# Patient Record
Sex: FEMALE | Race: WHITE | NOT HISPANIC OR LATINO | Employment: FULL TIME | URBAN - METROPOLITAN AREA
[De-identification: names, ages, dates, MRNs, and addresses within clinical notes are randomized per-mention and may not be internally consistent; named-entity substitution may affect disease eponyms.]

---

## 2017-11-15 ENCOUNTER — APPOINTMENT (OUTPATIENT)
Dept: CT IMAGING | Facility: CLINIC | Age: 22
End: 2017-11-15
Attending: EMERGENCY MEDICINE
Payer: COMMERCIAL

## 2017-11-15 ENCOUNTER — TRANSFERRED RECORDS (OUTPATIENT)
Dept: HEALTH INFORMATION MANAGEMENT | Facility: CLINIC | Age: 22
End: 2017-11-15

## 2017-11-15 ENCOUNTER — HOSPITAL ENCOUNTER (OUTPATIENT)
Facility: CLINIC | Age: 22
Setting detail: OBSERVATION
Discharge: HOME | End: 2017-11-16
Attending: EMERGENCY MEDICINE | Admitting: EMERGENCY MEDICINE
Payer: COMMERCIAL

## 2017-11-15 DIAGNOSIS — N39.0 URINARY TRACT INFECTION WITHOUT HEMATURIA, SITE UNSPECIFIED: Primary | ICD-10-CM

## 2017-11-15 DIAGNOSIS — N30.00 ACUTE CYSTITIS WITHOUT HEMATURIA: ICD-10-CM

## 2017-11-15 DIAGNOSIS — R10.13 ABDOMINAL PAIN, EPIGASTRIC: ICD-10-CM

## 2017-11-15 DIAGNOSIS — R10.11 ABDOMINAL PAIN, RIGHT UPPER QUADRANT: ICD-10-CM

## 2017-11-15 LAB
ALBUMIN SERPL-MCNC: 3.3 G/DL (ref 3.4–5)
ALBUMIN UR-MCNC: 10 MG/DL
ALP SERPL-CCNC: 43 U/L (ref 40–150)
ALT SERPL W P-5'-P-CCNC: 38 U/L (ref 0–50)
ANION GAP SERPL CALCULATED.3IONS-SCNC: 7 MMOL/L (ref 3–14)
APPEARANCE UR: CLEAR
AST SERPL W P-5'-P-CCNC: 37 U/L (ref 0–45)
BACTERIA #/AREA URNS HPF: ABNORMAL /HPF
BASOPHILS # BLD AUTO: 0 10E9/L (ref 0–0.2)
BASOPHILS NFR BLD AUTO: 0.2 %
BILIRUB DIRECT SERPL-MCNC: 0.1 MG/DL (ref 0–0.2)
BILIRUB SERPL-MCNC: 0.7 MG/DL (ref 0.2–1.3)
BILIRUB UR QL STRIP: NEGATIVE
BUN SERPL-MCNC: 8 MG/DL (ref 7–30)
CALCIUM SERPL-MCNC: 9 MG/DL (ref 8.5–10.1)
CHLORIDE SERPL-SCNC: 106 MMOL/L (ref 94–109)
CO2 SERPL-SCNC: 24 MMOL/L (ref 20–32)
COLOR UR AUTO: YELLOW
CREAT BLD-MCNC: 0.9 MG/DL (ref 0.52–1.04)
CREAT SERPL-MCNC: 0.86 MG/DL (ref 0.52–1.04)
DIFFERENTIAL METHOD BLD: ABNORMAL
EOSINOPHIL # BLD AUTO: 0 10E9/L (ref 0–0.7)
EOSINOPHIL NFR BLD AUTO: 0.1 %
ERYTHROCYTE [DISTWIDTH] IN BLOOD BY AUTOMATED COUNT: 13.4 % (ref 10–15)
GFR SERPL CREATININE-BSD FRML MDRD: 79 ML/MIN/1.7M2
GFR SERPL CREATININE-BSD FRML MDRD: 83 ML/MIN/1.7M2
GLUCOSE SERPL-MCNC: 88 MG/DL (ref 70–99)
GLUCOSE UR STRIP-MCNC: NEGATIVE MG/DL
HCG SERPL QL: NEGATIVE
HCG UR QL: NEGATIVE
HCT VFR BLD AUTO: 41.8 % (ref 35–47)
HGB BLD-MCNC: 13.7 G/DL (ref 11.7–15.7)
HGB UR QL STRIP: ABNORMAL
IMM GRANULOCYTES # BLD: 0 10E9/L (ref 0–0.4)
IMM GRANULOCYTES NFR BLD: 0.2 %
KETONES UR STRIP-MCNC: 80 MG/DL
LEUKOCYTE ESTERASE UR QL STRIP: ABNORMAL
LIPASE SERPL-CCNC: 118 U/L (ref 73–393)
LYMPHOCYTES # BLD AUTO: 1.5 10E9/L (ref 0.8–5.3)
LYMPHOCYTES NFR BLD AUTO: 13.4 %
MCH RBC QN AUTO: 30.9 PG (ref 26.5–33)
MCHC RBC AUTO-ENTMCNC: 32.8 G/DL (ref 31.5–36.5)
MCV RBC AUTO: 94 FL (ref 78–100)
MONOCYTES # BLD AUTO: 1.4 10E9/L (ref 0–1.3)
MONOCYTES NFR BLD AUTO: 12.5 %
MUCOUS THREADS #/AREA URNS LPF: PRESENT /LPF
NEUTROPHILS # BLD AUTO: 8.4 10E9/L (ref 1.6–8.3)
NEUTROPHILS NFR BLD AUTO: 73.6 %
NITRATE UR QL: NEGATIVE
NRBC # BLD AUTO: 0 10*3/UL
NRBC BLD AUTO-RTO: 0 /100
PH UR STRIP: 6 PH (ref 5–7)
PLATELET # BLD AUTO: 271 10E9/L (ref 150–450)
POTASSIUM SERPL-SCNC: 3.5 MMOL/L (ref 3.4–5.3)
PROT SERPL-MCNC: 7.6 G/DL (ref 6.8–8.8)
RBC # BLD AUTO: 4.44 10E12/L (ref 3.8–5.2)
RBC #/AREA URNS AUTO: 8 /HPF (ref 0–2)
SODIUM SERPL-SCNC: 138 MMOL/L (ref 133–144)
SOURCE: ABNORMAL
SP GR UR STRIP: 1.01 (ref 1–1.03)
SQUAMOUS #/AREA URNS AUTO: 2 /HPF (ref 0–1)
UROBILINOGEN UR STRIP-MCNC: 2 MG/DL (ref 0–2)
WBC # BLD AUTO: 11.4 10E9/L (ref 4–11)
WBC #/AREA URNS AUTO: 2 /HPF (ref 0–2)

## 2017-11-15 PROCEDURE — 96375 TX/PRO/DX INJ NEW DRUG ADDON: CPT

## 2017-11-15 PROCEDURE — 85025 COMPLETE CBC W/AUTO DIFF WBC: CPT | Performed by: EMERGENCY MEDICINE

## 2017-11-15 PROCEDURE — 25000128 H RX IP 250 OP 636: Performed by: EMERGENCY MEDICINE

## 2017-11-15 PROCEDURE — 80048 BASIC METABOLIC PNL TOTAL CA: CPT | Performed by: EMERGENCY MEDICINE

## 2017-11-15 PROCEDURE — 83690 ASSAY OF LIPASE: CPT | Performed by: EMERGENCY MEDICINE

## 2017-11-15 PROCEDURE — 81001 URINALYSIS AUTO W/SCOPE: CPT | Performed by: EMERGENCY MEDICINE

## 2017-11-15 PROCEDURE — 87086 URINE CULTURE/COLONY COUNT: CPT | Performed by: PHYSICIAN ASSISTANT

## 2017-11-15 PROCEDURE — 80076 HEPATIC FUNCTION PANEL: CPT | Performed by: EMERGENCY MEDICINE

## 2017-11-15 PROCEDURE — 96365 THER/PROPH/DIAG IV INF INIT: CPT

## 2017-11-15 PROCEDURE — 71260 CT THORAX DX C+: CPT

## 2017-11-15 PROCEDURE — 99285 EMERGENCY DEPT VISIT HI MDM: CPT | Mod: 25

## 2017-11-15 PROCEDURE — 99219 ZZC INITIAL OBSERVATION CARE,LEVL II: CPT | Mod: Z6 | Performed by: PHYSICIAN ASSISTANT

## 2017-11-15 PROCEDURE — 84703 CHORIONIC GONADOTROPIN ASSAY: CPT | Performed by: EMERGENCY MEDICINE

## 2017-11-15 PROCEDURE — 82565 ASSAY OF CREATININE: CPT

## 2017-11-15 PROCEDURE — 93005 ELECTROCARDIOGRAM TRACING: CPT

## 2017-11-15 PROCEDURE — 81025 URINE PREGNANCY TEST: CPT | Performed by: EMERGENCY MEDICINE

## 2017-11-15 PROCEDURE — 96361 HYDRATE IV INFUSION ADD-ON: CPT

## 2017-11-15 RX ORDER — KETOROLAC TROMETHAMINE 30 MG/ML
15 INJECTION, SOLUTION INTRAMUSCULAR; INTRAVENOUS ONCE
Status: COMPLETED | OUTPATIENT
Start: 2017-11-15 | End: 2017-11-16

## 2017-11-15 RX ORDER — SODIUM CHLORIDE 9 MG/ML
1000 INJECTION, SOLUTION INTRAVENOUS CONTINUOUS
Status: DISCONTINUED | OUTPATIENT
Start: 2017-11-15 | End: 2017-11-17 | Stop reason: HOSPADM

## 2017-11-15 RX ORDER — CEFTRIAXONE 1 G/1
1 INJECTION, POWDER, FOR SOLUTION INTRAMUSCULAR; INTRAVENOUS ONCE
Status: COMPLETED | OUTPATIENT
Start: 2017-11-15 | End: 2017-11-15

## 2017-11-15 RX ORDER — MORPHINE SULFATE 2 MG/ML
1-2 INJECTION, SOLUTION INTRAMUSCULAR; INTRAVENOUS EVERY 4 HOURS PRN
Status: DISCONTINUED | OUTPATIENT
Start: 2017-11-15 | End: 2017-11-17 | Stop reason: HOSPADM

## 2017-11-15 RX ORDER — IOPAMIDOL 755 MG/ML
75 INJECTION, SOLUTION INTRAVASCULAR ONCE
Status: COMPLETED | OUTPATIENT
Start: 2017-11-15 | End: 2017-11-15

## 2017-11-15 RX ADMIN — SODIUM CHLORIDE 1000 ML: 9 INJECTION, SOLUTION INTRAVENOUS at 20:33

## 2017-11-15 RX ADMIN — SODIUM CHLORIDE 1000 ML: 9 INJECTION, SOLUTION INTRAVENOUS at 23:04

## 2017-11-15 RX ADMIN — CEFTRIAXONE 1 G: 1 INJECTION, POWDER, FOR SOLUTION INTRAMUSCULAR; INTRAVENOUS at 20:59

## 2017-11-15 RX ADMIN — IOPAMIDOL 75 ML: 755 INJECTION, SOLUTION INTRAVENOUS at 19:48

## 2017-11-15 ASSESSMENT — ENCOUNTER SYMPTOMS
BACK PAIN: 0
ABDOMINAL PAIN: 1
HEMATURIA: 0
SHORTNESS OF BREATH: 0
DYSURIA: 0
VOMITING: 0
CHILLS: 0
NAUSEA: 1
FEVER: 0
COUGH: 1

## 2017-11-15 NOTE — ED PROVIDER NOTES
History     Chief Complaint   Patient presents with     Abdominal Pain     HPI  Rianna West is a 22 year old female who presents for evaluation of abdominal pain. The patient reports that she developed pain in the right upper quadrant abdomen on Monday, 2 days ago, which has been constant since onset. She has noted that the pain is provoked with deep breaths and with coughs. She reports some associated intermittent, mild nausea, but she denies any vomiting. She also denies any chest pain or leg pain, and she states that she has no history of DVT/PE, has done no recent long-distance travel, and has not undergone any recent surgical procedure. She does report b/l low back pain, perhaps R>L. She denies urinary symptoms, no lightheadedness or dizziness, and she states that she has no history of heart problem. She reports that she is otherwise healthy. She has been on oral contraceptive therapy over the past couple of years, states her last menstrual period was in July, which is normal for her. S    She was seen at [facility] today where she was diagnosed with a UTI and had a negative RUQ U/S. She was scheduled for an outpatient HIDA scan next week. She also had a +dimer and was found to be tachycardic, for which reason she was referred into ED.    No current facility-administered medications for this encounter.      No current outpatient prescriptions on file.     No past medical history on file.    No past surgical history on file.    No family history on file.    Social History   Substance Use Topics     Smoking status: Not on file     Smokeless tobacco: Not on file     Alcohol use Not on file      No Known Allergies    I have reviewed the Medications, Allergies, Past Medical and Surgical History, and Social History in the Epic system.    Review of Systems   Constitutional: Negative for chills and fever.   Respiratory: Positive for cough. Negative for shortness of breath.    Cardiovascular: Negative  "for chest pain and leg swelling.   Gastrointestinal: Positive for abdominal pain (RUQ) and nausea (intermittent). Negative for vomiting.   Genitourinary: Negative for dysuria, hematuria, vaginal bleeding and vaginal discharge.   Musculoskeletal: Negative for back pain.   All other systems reviewed and are negative.      Physical Exam   BP: (!) 158/91  Pulse: 142  Temp: 97.8  F (36.6  C)  Resp: 16  Height: 167.6 cm (5' 6\")  Weight: 122.5 kg (270 lb)  SpO2: 97 %      Physical Exam   Constitutional: She is oriented to person, place, and time. No distress.   HENT:   Head: Atraumatic.   Mouth/Throat: Oropharynx is clear and moist. No oropharyngeal exudate.   Eyes: Pupils are equal, round, and reactive to light. No scleral icterus.   Cardiovascular: Normal heart sounds and intact distal pulses.    Pulmonary/Chest: Breath sounds normal. No respiratory distress.   Abdominal: Soft. Bowel sounds are normal. There is tenderness.   Minimally tender in RUQ to deep palpation, neg Tena's   Genitourinary:   Genitourinary Comments: No CVA tenderness   Musculoskeletal: She exhibits no edema or tenderness.   Neurological: She is alert and oriented to person, place, and time.   Skin: Skin is warm. No rash noted. She is not diaphoretic.   Nursing note and vitals reviewed.      ED Course     ED Course     Procedures         EKG: sinus tach, non-ischemic    Critical Care time:  none             Labs Ordered and Resulted from Time of ED Arrival Up to the Time of Departure from the ED - No data to display         Assessments & Plan (with Medical Decision Making)     22 year old female who presents for evaluation of abdominal pain and back pain and +dimer from [place]. Her lab result and tachycardia are concerning for possible PE. IV established and patient placed on cardiac monitor which revealed sinus tach in 130s but otherwise stable VS. Labs sent and remarkable for WBC 11.4. Patient sent for CT Chest PE protocol which was neg for PE. " Patient given NSS 1 L IVF bolus with persistent tachycardia. Patient given po challenge with recurrence of RUQ pain. Repeat abdominal exam unchanged, minimally tender. U/A sent and essentially negative, however given leukocytosis and report of +UA this am, patient given ceftriaxone 1g IV. Case discussed with ED OBS ANDREW and plans for ED OBS stay with serial abdominal exams and MRCP in am.     I have reviewed the nursing notes.    I have reviewed the findings, diagnosis, plan and need for follow up with the patient.    New Prescriptions    No medications on file       Final diagnoses:   Abdominal pain, epigastric     I, Stephan Ashraf, sandrine serving as a trained medical scribe to document services personally performed by Vivek Mcdaniel MD, based on the provider's statements to me.      IVivek MD, was physically present and have reviewed and verified the accuracy of this note documented by Stephan Ashraf.    11/15/2017   [facility], EMERGENCY DEPARTMENT     Vivek Mcdaniel MD  11/16/17 0115

## 2017-11-15 NOTE — IP AVS SNAPSHOT
Unit 6D Observation [address] [address]    Phone:  [phone]    Fax:  [phone]                                       After Visit Summary   11/15/2017    Rianna West    MRN: [id number]           After Visit Summary Signature Page     I have received my discharge instructions, and my questions have been answered. I have discussed any challenges I see with this plan with the nurse or doctor.    ..........................................................................................................................................  Patient/Patient Representative Signature      ..........................................................................................................................................  Patient Representative Print Name and Relationship to Patient    ..................................................               ................................................  Date                                            Time    ..........................................................................................................................................  Reviewed by Signature/Title    ...................................................              ..............................................  Date                                                            Time

## 2017-11-15 NOTE — ED NOTES
"Triage Assessment & Note:    BP (!) 158/91  Pulse 142  Temp 97.8  F (36.6  C) (Oral)  Resp 16  Ht 1.676 m (5' 6\")  Wt 122.5 kg (270 lb)  LMP 07/12/2017 (Approximate)  SpO2 97%  BMI 43.58 kg/m2    Patient presents with: C/o RUQ abdominal pain with a cough. PT is noted to have an elevated HR and diaphoresis with chills. PT was seen at [facility] 2 times this week.     Home Treatments/Remedies: None    Febrile / Afebrile? Afebrile    Duration of C/o:  3 days    Margarito Ames  November 15, 2017      "

## 2017-11-15 NOTE — IP AVS SNAPSHOT
MRN:[id number]                      After Visit Summary   11/15/2017    Rianna West    MRN: [id number]           Thank you!     Thank you for choosing [facility] for your care. Our goal is always to provide you with excellent care. Hearing back from our patients is one way we can continue to improve our services. Please take a few minutes to complete the written survey that you may receive in the mail after you visit with us. Thank you!        Patient Information     Date Of Birth          1995        Designated Caregiver       Most Recent Value    Caregiver    Will someone help with your care after discharge? no      About your hospital stay     You were admitted on:  November 16, 2017 You last received care in the:  Unit 6D Observation [facility]    You were discharged on:  November 16, 2017        Reason for your hospital stay       You were admitted to ED observation unit due to ongoing abdominal pain associated with fast heart rate and elevated blood pressure, for serial abdominal exams, lab work up and studies. Your vitals signs improved with IV fluids and your lab work up and imaging studies were negative for any acute cause of your pain. You were also treated for a UTI.                  Who to Call     For medical emergencies, please call 911.  For non-urgent questions about your medical care, please call your primary care provider or clinic, None          Attending Provider     Provider Specialty    Vivek Mcdaniel MD Emergency Medicine    [facility], Mary Caal MD Emergency Medicine       Primary Care Provider Fax #    Provider Not In System [phone]       When to contact your care team       Call your primary doctor if you have any of the following: temperature greater than 102 or less than 95, development of chest pain, shortness of breath or difficulty breathing, blood in your stools, vomiting blood, or worsening of your abdominal pain.                  After Care  Instructions     Activity       Your activity upon discharge: activity as tolerated            Diet       Follow this diet upon discharge: Regular adult diet            Discharge Instructions       Complete 3 day course of antibiotics for your UTI.                  Follow-up Appointments     Adult [facility] Follow-up and recommended labs and tests       Follow up with primary care provider, Provider Not In System, within 7 days for hospital follow- up.  No follow up labs or test are needed.      Appointments on [place] and/or [facility] (with [facility] or [facility] provider or service). Call [phone] if you haven't heard regarding these appointments within 7 days of discharge.                  Your next 10 appointments already scheduled     Nov 20, 2017  8:30 AM CST   NM HEPATOBILIARY SCAN with [facility]   [facility], [place], Nuclear Medicine ([facility], [facility])    [address] [address] [phone]           Please bring a list of your medicines to the exam. (Include vitamins, minerals and over-the-counter drugs.) You should wear comfortable clothes. Leave your valuables at home. Please bring related prior results and films.  Tell your doctor:   If you are breastfeeding or may be pregnant.   If you have had a barium test within the past few days. Barium may change the results of certain exams.   If you think you may need sedation (medicine to help you relax).  No food or drink (including water) for 4 hours prior to the exam.  No morphine or morphine derivatives for 6 hours prior to the exam.  Please call your Imaging Department at your exam site with any questions.              Pending Results     Date and Time Order Name Status Description    11/16/2017 0135 MR Abdomen MRCP w/o & w Contrast In process     11/15/2017 2347 Urine Culture Aerobic Bacterial Preliminary             Statement of Approval     Ordered          11/16/17 8045  I have reviewed and  "agree with all the recommendations and orders detailed in this document.  EFFECTIVE NOW     Approved and electronically signed by:  Marie Carreon PA-C             Admission Information     Date & Time Provider Department Dept. Phone    11/15/2017 Mary Harp MD Unit 6D Observation [facility] [phone]      Your Vitals Were     Blood Pressure Pulse Temperature Respirations Height Weight    126/64 92 99.4  F (37.4  C) (Oral) 16 1.676 m (5' 6\") 125.3 kg (276 lb 3.2 oz)    Last Period Pulse Oximetry BMI (Body Mass Index)             2017 (Approximate) 98% 44.58 kg/m2         MyChart Information     Fly6 lets you send messages to your doctor, view your test results, renew your prescriptions, schedule appointments and more. To sign up, go to [email or web address] . Click on \"Log in\" on the left side of the screen, which will take you to the Welcome page. Then click on \"Sign up Now\" on the right side of the page.     You will be asked to enter the access code listed below, as well as some personal information. Please follow the directions to create your username and password.     Your access code is: [email or web address]  Expires: 2018  6:30 AM     Your access code will  in 90 days. If you need help or a new code, please call your [facility] clinic or [phone].        Care EveryWhere ID     This is your Care EveryWhere ID. This could be used by other organizations to access your [facility] medical records  [id number]        Equal Access to Services     [facility]: Hadii kristyn graham Sourmila, waaxda luqadaha, qaybta kaalmasallie mchugh . So [facility] [phone].    ATENCIÓN: Si habla español, tiene a parks disposición servicios gratuitos de asistencia lingüística. Llame al [phone].    We comply with applicable federal civil rights laws and [place] laws. We do not discriminate on the basis of race, color, national origin, age, disability, " sex, sexual orientation, or gender identity.               Review of your medicines      Notice     You have not been prescribed any medications.             Protect others around you: Learn how to safely use, store and throw away your medicines at www.disposemymeds.org.             Medication List: This is a list of all your medications and when to take them. Check marks below indicate your daily home schedule. Keep this list as a reference.      Notice     You have not been prescribed any medications.

## 2017-11-16 ENCOUNTER — APPOINTMENT (OUTPATIENT)
Dept: MRI IMAGING | Facility: CLINIC | Age: 22
End: 2017-11-16
Attending: PHYSICIAN ASSISTANT
Payer: COMMERCIAL

## 2017-11-16 VITALS
TEMPERATURE: 99.4 F | BODY MASS INDEX: 44.39 KG/M2 | RESPIRATION RATE: 16 BRPM | HEART RATE: 92 BPM | WEIGHT: 276.2 LBS | DIASTOLIC BLOOD PRESSURE: 64 MMHG | HEIGHT: 66 IN | SYSTOLIC BLOOD PRESSURE: 126 MMHG | OXYGEN SATURATION: 98 %

## 2017-11-16 PROBLEM — R10.9 ABDOMINAL PAIN: Status: ACTIVE | Noted: 2017-11-16

## 2017-11-16 LAB
ALBUMIN SERPL-MCNC: 2.7 G/DL (ref 3.4–5)
ALP SERPL-CCNC: 38 U/L (ref 40–150)
ALT SERPL W P-5'-P-CCNC: 40 U/L (ref 0–50)
ANION GAP SERPL CALCULATED.3IONS-SCNC: 9 MMOL/L (ref 3–14)
AST SERPL W P-5'-P-CCNC: 39 U/L (ref 0–45)
BASOPHILS # BLD AUTO: 0 10E9/L (ref 0–0.2)
BASOPHILS NFR BLD AUTO: 0.1 %
BILIRUB SERPL-MCNC: 0.4 MG/DL (ref 0.2–1.3)
BUN SERPL-MCNC: 7 MG/DL (ref 7–30)
CALCIUM SERPL-MCNC: 8 MG/DL (ref 8.5–10.1)
CHLORIDE SERPL-SCNC: 111 MMOL/L (ref 94–109)
CO2 SERPL-SCNC: 19 MMOL/L (ref 20–32)
CREAT SERPL-MCNC: 0.74 MG/DL (ref 0.52–1.04)
DIFFERENTIAL METHOD BLD: NORMAL
EOSINOPHIL # BLD AUTO: 0 10E9/L (ref 0–0.7)
EOSINOPHIL NFR BLD AUTO: 0.1 %
ERYTHROCYTE [DISTWIDTH] IN BLOOD BY AUTOMATED COUNT: 13.4 % (ref 10–15)
GFR SERPL CREATININE-BSD FRML MDRD: >90 ML/MIN/1.7M2
GLUCOSE SERPL-MCNC: 80 MG/DL (ref 70–99)
HCT VFR BLD AUTO: 36.5 % (ref 35–47)
HGB BLD-MCNC: 12 G/DL (ref 11.7–15.7)
IMM GRANULOCYTES # BLD: 0 10E9/L (ref 0–0.4)
IMM GRANULOCYTES NFR BLD: 0.1 %
INTERPRETATION ECG - MUSE: NORMAL
LACTATE BLD-SCNC: 0.8 MMOL/L (ref 0.7–2)
LIPASE SERPL-CCNC: 122 U/L (ref 73–393)
LYMPHOCYTES # BLD AUTO: 1.3 10E9/L (ref 0.8–5.3)
LYMPHOCYTES NFR BLD AUTO: 16.4 %
MCH RBC QN AUTO: 30.5 PG (ref 26.5–33)
MCHC RBC AUTO-ENTMCNC: 32.9 G/DL (ref 31.5–36.5)
MCV RBC AUTO: 93 FL (ref 78–100)
MONOCYTES # BLD AUTO: 1.1 10E9/L (ref 0–1.3)
MONOCYTES NFR BLD AUTO: 13.6 %
NEUTROPHILS # BLD AUTO: 5.4 10E9/L (ref 1.6–8.3)
NEUTROPHILS NFR BLD AUTO: 69.7 %
NRBC # BLD AUTO: 0 10*3/UL
NRBC BLD AUTO-RTO: 0 /100
PLATELET # BLD AUTO: 204 10E9/L (ref 150–450)
POTASSIUM SERPL-SCNC: 3.4 MMOL/L (ref 3.4–5.3)
PROT SERPL-MCNC: 6.2 G/DL (ref 6.8–8.8)
RBC # BLD AUTO: 3.93 10E12/L (ref 3.8–5.2)
SODIUM SERPL-SCNC: 139 MMOL/L (ref 133–144)
WBC # BLD AUTO: 7.8 10E9/L (ref 4–11)

## 2017-11-16 PROCEDURE — 74183 MRI ABD W/O CNTR FLWD CNTR: CPT

## 2017-11-16 PROCEDURE — 25000128 H RX IP 250 OP 636: Performed by: EMERGENCY MEDICINE

## 2017-11-16 PROCEDURE — 80053 COMPREHEN METABOLIC PANEL: CPT | Performed by: NURSE PRACTITIONER

## 2017-11-16 PROCEDURE — 83690 ASSAY OF LIPASE: CPT | Performed by: NURSE PRACTITIONER

## 2017-11-16 PROCEDURE — 96376 TX/PRO/DX INJ SAME DRUG ADON: CPT

## 2017-11-16 PROCEDURE — 96361 HYDRATE IV INFUSION ADD-ON: CPT

## 2017-11-16 PROCEDURE — 83605 ASSAY OF LACTIC ACID: CPT | Performed by: PHYSICIAN ASSISTANT

## 2017-11-16 PROCEDURE — G0378 HOSPITAL OBSERVATION PER HR: HCPCS

## 2017-11-16 PROCEDURE — 36415 COLL VENOUS BLD VENIPUNCTURE: CPT | Performed by: PHYSICIAN ASSISTANT

## 2017-11-16 PROCEDURE — 36415 COLL VENOUS BLD VENIPUNCTURE: CPT | Performed by: NURSE PRACTITIONER

## 2017-11-16 PROCEDURE — 99217 ZZC OBSERVATION CARE DISCHARGE: CPT | Mod: Z6 | Performed by: PHYSICIAN ASSISTANT

## 2017-11-16 PROCEDURE — A9585 GADOBUTROL INJECTION: HCPCS | Performed by: EMERGENCY MEDICINE

## 2017-11-16 PROCEDURE — 25000128 H RX IP 250 OP 636: Performed by: NURSE PRACTITIONER

## 2017-11-16 PROCEDURE — 40000141 ZZH STATISTIC PERIPHERAL IV START W/O US GUIDANCE

## 2017-11-16 PROCEDURE — 25000132 ZZH RX MED GY IP 250 OP 250 PS 637: Performed by: PHYSICIAN ASSISTANT

## 2017-11-16 PROCEDURE — 85025 COMPLETE CBC W/AUTO DIFF WBC: CPT | Performed by: NURSE PRACTITIONER

## 2017-11-16 RX ORDER — ACETAMINOPHEN 325 MG/1
650 TABLET ORAL EVERY 4 HOURS PRN
Status: DISCONTINUED | OUTPATIENT
Start: 2017-11-16 | End: 2017-11-17 | Stop reason: HOSPADM

## 2017-11-16 RX ORDER — ONDANSETRON 2 MG/ML
4 INJECTION INTRAMUSCULAR; INTRAVENOUS EVERY 6 HOURS PRN
Status: DISCONTINUED | OUTPATIENT
Start: 2017-11-16 | End: 2017-11-17 | Stop reason: HOSPADM

## 2017-11-16 RX ORDER — ACETAMINOPHEN 650 MG/1
650 SUPPOSITORY RECTAL EVERY 4 HOURS PRN
Status: DISCONTINUED | OUTPATIENT
Start: 2017-11-16 | End: 2017-11-17 | Stop reason: HOSPADM

## 2017-11-16 RX ORDER — GADOBUTROL 604.72 MG/ML
10 INJECTION INTRAVENOUS ONCE
Status: COMPLETED | OUTPATIENT
Start: 2017-11-16 | End: 2017-11-16

## 2017-11-16 RX ORDER — KETOROLAC TROMETHAMINE 30 MG/ML
15 INJECTION, SOLUTION INTRAMUSCULAR; INTRAVENOUS EVERY 6 HOURS PRN
Status: DISCONTINUED | OUTPATIENT
Start: 2017-11-16 | End: 2017-11-17 | Stop reason: HOSPADM

## 2017-11-16 RX ORDER — NALOXONE HYDROCHLORIDE 0.4 MG/ML
.1-.4 INJECTION, SOLUTION INTRAMUSCULAR; INTRAVENOUS; SUBCUTANEOUS
Status: DISCONTINUED | OUTPATIENT
Start: 2017-11-16 | End: 2017-11-17 | Stop reason: HOSPADM

## 2017-11-16 RX ORDER — LIDOCAINE 40 MG/G
CREAM TOPICAL
Status: DISCONTINUED | OUTPATIENT
Start: 2017-11-16 | End: 2017-11-17 | Stop reason: HOSPADM

## 2017-11-16 RX ORDER — ONDANSETRON 4 MG/1
4 TABLET, ORALLY DISINTEGRATING ORAL EVERY 6 HOURS PRN
Status: DISCONTINUED | OUTPATIENT
Start: 2017-11-16 | End: 2017-11-17 | Stop reason: HOSPADM

## 2017-11-16 RX ORDER — SULFAMETHOXAZOLE AND TRIMETHOPRIM 400; 80 MG/1; MG/1
1 TABLET ORAL 2 TIMES DAILY
Qty: 5 TABLET | Refills: 0 | Status: SHIPPED | OUTPATIENT
Start: 2017-11-16 | End: 2017-11-16

## 2017-11-16 RX ADMIN — KETOROLAC TROMETHAMINE 15 MG: 30 INJECTION, SOLUTION INTRAMUSCULAR at 14:45

## 2017-11-16 RX ADMIN — SODIUM CHLORIDE 1000 ML: 9 INJECTION, SOLUTION INTRAVENOUS at 10:30

## 2017-11-16 RX ADMIN — SODIUM CHLORIDE 100 ML: 9 INJECTION, SOLUTION INTRAVENOUS at 20:03

## 2017-11-16 RX ADMIN — SODIUM CHLORIDE 1000 ML: 9 INJECTION, SOLUTION INTRAVENOUS at 02:32

## 2017-11-16 RX ADMIN — ACETAMINOPHEN 650 MG: 325 TABLET, FILM COATED ORAL at 08:12

## 2017-11-16 RX ADMIN — GADOBUTROL 10 ML: 604.72 INJECTION INTRAVENOUS at 20:02

## 2017-11-16 RX ADMIN — KETOROLAC TROMETHAMINE 15 MG: 30 INJECTION, SOLUTION INTRAMUSCULAR at 00:22

## 2017-11-16 RX ADMIN — ACETAMINOPHEN 650 MG: 325 TABLET, FILM COATED ORAL at 17:41

## 2017-11-16 ASSESSMENT — PAIN DESCRIPTION - DESCRIPTORS
DESCRIPTORS: ACHING
DESCRIPTORS: DISCOMFORT
DESCRIPTORS: ACHING
DESCRIPTORS: ACHING

## 2017-11-16 NOTE — PLAN OF CARE
Problem: Patient Care Overview  Goal: Plan of Care/Patient Progress Review  Outpatient/Observation goals to be met before discharge home:  -diagnostic tests and consults completed and resulted: No   -vital signs normal or at patient baseline: No still tachycardic   -tolerating oral intake to maintain hydration: No on NPO   -adequate pain control on oral analgesics: No   -MRCP completed: No

## 2017-11-16 NOTE — PLAN OF CARE
Observation goals:  -diagnostic tests and consults completed and resulted: No   -vital signs normal or at patient baseline: Yes  -tolerating oral intake to maintain hydration: No on NPO   -adequate pain control on oral analgesics: No, prn toradol ordered  -MRCP completed: No

## 2017-11-16 NOTE — PROGRESS NOTES
[facility]  Daily Progress Note          Assessment & Plan:   1. RUQ abdominal pain. Epigastric and RUQ pain over the last 2 days, worse immediately after eating.   Differential includes but is not limited to: acute cholecystitis, cholelithiasis, gastritis, PUD, pulmonary etiology such as pneumonia or PE.   Patient was seen at [facility] today for pain, which resulted in her having a RUQ US, which was negative for cholelithiasis or acute cholecystitis. D-dimer was also done in clinic and was positive, thus she was referred to the ED for PE workup.  In the ED, patient was tachycardic up to 142, but persistent in the 115-130 range on the monitor. BP was elevated initially, up to 177/100. BP normalized to 139/82 on last check in ED. RR 16-21, O2 sats >97% ORA. She was initially afebrile, however did develop a low-grade fever to 99.9F while in the ED.  EKG showed sinus tachycardia. CBC showed mild leukocytosis with WBCs at 11.5, with elevated monocytes and neutrophils. Serum HCG was negative.  CMP was unremarkable, lipase normal. CT PE was negative. Abdomen soft and nontender, remainder of exam benign. With negative RUQ US earlier today and no abdominal tenderness, CT abdomen not indicated at this point. She received 1L IV fluids. Also received Toradol 15mg IV for pain. Rocephin 1g was administered for UTI (see #2 below).  Without abdominal tenderness, no LFT elevation, normal lipase, and RUQ US showing no signs of acute cholecystitis, does not appear to have a surgical abdomen at this time. She was admitted to ED observation for serial abdominal exams, IV fluids, pain control, and MRCP in the AM.  - continuous cardiac monitoring  - serial abdominal exams  - repeat CBC, CMP in AM  - MRCP in AM  - Zofran prn nausea  - IVF 125ml/hr  - Tylenol prn pain; morphine 1-2mg prn severe pain        2.UTI. UA significant for small blood, trace leukocyte esterase, 2 WBCs, 8 RBCs,  few bacteria. She was diagnosed with a UTI at her clinic visit today. No dysuria, however she does have bilateral flank pain, mild leukocytosis, and low-grade fever, thus possible that this is pyelonephritis, despite unimpressive UA. Less likely nephrolithiasis with her presentation of 2 days of constant abdominal pain. Rocephin 1g given in ED.  - urine culture pending  - transition to oral antibiotics for discharge  - IV fluids              Chronic Medical Issues:  # Obesity.              Consults: none  FEN: NPO  DVT prophylaxis: early ambulation  Code Status: Full  1. RUQ abdominal pain. Epigastric and RUQ pain over the last 2 days, worse immediately after eating.   Differential includes but is not limited to: acute cholecystitis, cholelithiasis, gastritis, PUD, pulmonary etiology such as pneumonia or PE.   Patient was seen at [facility] today for pain, which resulted in her having a RUQ US, which was negative for cholelithiasis or acute cholecystitis. D-dimer was also done in clinic and was positive, thus she was referred to the ED for PE workup.  In the ED, patient was tachycardic up to 142, but persistent in the 115-130 range on the monitor. BP was elevated initially, up to 177/100. BP normalized to 139/82 on last check in ED. RR 16-21, O2 sats >97% ORA. She was initially afebrile, however did develop a low-grade fever to 99.9F while in the ED.  EKG showed sinus tachycardia. CBC showed mild leukocytosis with WBCs at 11.5, with elevated monocytes and neutrophils. Serum HCG was negative.  CMP was unremarkable, lipase normal. CT PE was negative. Abdomen soft and nontender, remainder of exam benign. With negative RUQ US earlier today and no abdominal tenderness, CT abdomen not indicated at this point. She received 1L IV fluids. Also received Toradol 15mg IV for pain. Rocephin 1g was administered for UTI (see #2 below).  Without abdominal tenderness, no LFT elevation, normal lipase, and RUQ US showing  no signs of acute cholecystitis, does not appear to have a surgical abdomen at this time. She was admitted to ED observation for serial abdominal exams, IV fluids, pain control, and MRCP in the AM.  - continuous cardiac monitoring, remains in sinus tachycardia   - serial abdominal exams  - repeat CBC, CMP pending  - MRCP in AM  - Zofran prn nausea  - IVF 125ml/hr  - Tylenol prn pain; Toradol 15 mg every 6 hours prn severe pain        2.UTI. UA significant for small blood, trace leukocyte esterase, 2 WBCs, 8 RBCs, few bacteria. She was diagnosed with a UTI at her clinic visit today. No dysuria, however she does have bilateral flank pain, mild leukocytosis, and low-grade fever, thus possible that this is pyelonephritis, despite unimpressive UA. Less likely nephrolithiasis with her presentation of 2 days of constant abdominal pain. Rocephin 1g given in ED.  - urine culture pending  - transition to oral antibiotics (Bactrim) for discharge  - IV fluids              Chronic Medical Issues:  # Obesity.              Consults: none  FEN: NPO  DVT prophylaxis: early ambulation  Code Status: Full  Disposition: discharge today if negative MRCP, pain controlled, vital signs returned to baseline            Consults:   None         Discharge Planning:     Disposition: discharge today if negative MRCP, pain controlled, vital signs returned to baseline         Interval History:   Ongoing complaints of right upper quadrant pain, worse after eating.  No nausea, vomiting.  Has been passing flatus.  Also notes pain with coughing and movement.  Describes the pain as constant and dull.  Patient also remains tachycardic.  No history of tachycardia.  Currently denies CP, SOB, Dizziness.  Also notes ongoing bilateral flank and diffuse lower back pain- had UA obtained yesterday and was started on Bactrim for presumed UTI by PCP.      ROS:   Constitutional: No fevers/chills.  Cardiovascular: No chest pain or palpitations.   Respiratory: No  "cough or SOB.   GI: RUQ pain No N/V.      : No urinary complaints.   Musculoskeletal: Denies pain.           Physical Exam:   /74 (BP Location: Left arm)  Pulse 142  Temp 100.2  F (37.9  C) (Oral)  Resp 16  Ht 1.676 m (5' 6\")  Wt 125.3 kg (276 lb 3.2 oz)  LMP 07/12/2017 (Approximate)  SpO2 98%  BMI 44.58 kg/m2     GENERAL: Alert and oriented x 3. NAD.   HEENT: Anicteric sclera. Mucous membranes moist.   CV: RRR. S1, S2. No murmurs appreciated.   RESPIRATORY: Effort normal. Lungs CTAB with no wheezing, rales, rhonchi.   GI: Abdomen soft and non distended with hypoactive bowel sounds present in all quadrants. Tenderness with palpation to the RUQ  NEUROLOGICAL: No focal deficits. Moves all extremities.    EXTREMITIES: No peripheral edema. Intact bilateral pedal pulses.   SKIN: No jaundice. No rashes.     Medication list reviewed.   Morning labs pending     BASHIR Silverio, CNP  Emergency Department Observation Unit      "

## 2017-11-16 NOTE — PROGRESS NOTES
Care Coordinator- Assessment Note     Admission Date/Time:  11/15/2017  Attending MD:  Mary Harp, *     Data  Chart reviewed, discussed with interdisciplinary team.   Patient was admitted for:   1. Abdominal pain, epigastric         RNCC Assessment  Concerns with insurance coverage for discharge needs: None.  Current Living Situation: Patient lives in college housing.  Support System: Supportive  Services Involved: none identified  Transportation: Family or Friend will provide  Barriers to Discharge: none identified      Assessment: Per Chart review: the patient doesn't appear to have any complex needs during admission or at discharge.  Patient is independent in mobility.  Plan is for the patient to return to previous situation. RNCC will continue to follow and assess if needs arise.       Plan  Anticipated Discharge Date:  11/16-11/17  Anticipated Discharge Plan:  Home     CTS Handoff completed: yes  Lissy Chance, RN, MSN, PHN  RNCCPH: [phone]  Pager: [phone]  Covering for : Jeanine Mora, ED/OBS RNCC

## 2017-11-16 NOTE — PLAN OF CARE
Problem: Patient Care Overview  Goal: Plan of Care/Patient Progress Review  Observation goals:  -diagnostic tests and consults completed and resulted: No   -vital signs normal or at patient baseline: Yes  -tolerating oral intake to maintain hydration: No on NPO   -adequate pain control on oral analgesics: No, received Toradol x1 for pain   -MRCP completed: No

## 2017-11-16 NOTE — PLAN OF CARE
Observation goals:  -diagnostic tests and consults completed and resulted: No   -vital signs normal or at patient baseline: No, Temp 100.2, tylenol given, ANDREW aware  -tolerating oral intake to maintain hydration: No on NPO   -adequate pain control on oral analgesics: No, prn toradol ordered  -MRCP completed: No

## 2017-11-16 NOTE — H&P
[facility] ED Observation Admission Note    Chief Complaint   Patient presents with     Abdominal Pain       Assessment/Plan:  Rianna West is a 22 year old female who presents for evaluation of abdominal pain for 2 days.    1. RUQ abdominal pain. Epigastric and RUQ pain over the last 2 days, worse immediately after eating.   Differential includes but is not limited to: acute cholecystitis, cholelithiasis, gastritis, PUD, pulmonary etiology such as pneumonia or PE.   Patient was seen at [facility] today for pain, which resulted in her having a RUQ US, which was negative for cholelithiasis or acute cholecystitis. D-dimer was also done in clinic and was positive, thus she was referred to the ED for PE workup.  In the ED, patient was tachycardic up to 142, but persistent in the 115-130 range on the monitor. BP was elevated initially, up to 177/100. BP normalized to 139/82 on last check in ED. RR 16-21, O2 sats >97% ORA. She was initially afebrile, however did develop a low-grade fever to 99.9F while in the ED.  EKG showed sinus tachycardia. CBC showed mild leukocytosis with WBCs at 11.5, with elevated monocytes and neutrophils. Serum HCG was negative.  CMP was unremarkable, lipase normal. CT PE was negative. Abdomen soft and nontender, remainder of exam benign. With negative RUQ US earlier today and no abdominal tenderness, CT abdomen not indicated at this point. She received 1L IV fluids. Also received Toradol 15mg IV for pain. Rocephin 1g was administered for UTI (see #2 below).  Without abdominal tenderness, no LFT elevation, normal lipase, and RUQ US showing no signs of acute cholecystitis, does not appear to have a surgical abdomen at this time. She was admitted to ED observation for serial abdominal exams, IV fluids, pain control, and MRCP in the AM.  - continuous cardiac monitoring  - serial abdominal exams  - repeat CBC, CMP in AM  - MRCP in AM  - Zofran prn nausea  - IVF 125ml/hr  - Tylenol prn pain;  morphine 1-2mg prn severe pain      2.UTI. UA significant for small blood, trace leukocyte esterase, 2 WBCs, 8 RBCs, few bacteria. She was diagnosed with a UTI at her clinic visit today. No dysuria, however she does have bilateral flank pain, mild leukocytosis, and low-grade fever, thus possible that this is pyelonephritis, despite unimpressive UA. Less likely nephrolithiasis with her presentation of 2 days of constant abdominal pain. Rocephin 1g given in ED.  - urine culture pending  - transition to oral antibiotics for discharge  - IV fluids          Chronic Medical Issues:  # Obesity.          Consults: none  FEN: NPO  DVT prophylaxis: early ambulation  Code Status: Full  Disposition: discharge tomorrow if negative MRCP, pain controlled, vital signs returned to baseline                  HPI:  Rianna West is a 22 year old female who presents for evaluation of abdominal pain for 2 days. The patient reports that she developed pain in the right upper quadrant abdomen on Monday, 2 days ago, which has been constant since onset. She describes pain as generally dull and achy, but sharp and worse after eating. She has been tolerating oral intake, though admittedly less than usual.  She reports some associated intermittent, mild nausea, but she denies any vomiting. She denies dysuria, hematuria, urinary frequency, but does admit to bilateral flank and low back pain.   Patient notes that the abdominal pain is provoked with deep breaths and with coughs. She also denies any chest pain, leg pain, hemoptysis, shortness of breath. She states that she has no history of DVT/PE, has done no recent long-distance travel, and has not undergone any recent surgical procedure. She denies lightheadedness or dizziness, and she states that she has no history of heart problems. She reports that she is otherwise healthy. She has been on oral contraceptive therapy over the past couple of years, states her last menstrual period was in July,  which is normal for her.       In the ED, patient was tachycardic up to 142, but persistent in the 115-130 range on the monitor. BP was elevated initially, up to 177/100. BP normalized to 139/82 on last check in ED. RR 16-21, O2 sats >97% ORA. She was initially afebrile, however did develop a low-grade fever to 99.9F while in the ED.  EKG showed sinus tachycardia. CBC showed mild leukocytosis with WBCs at 11.5, with elevated monocytes and neutrophils. Serum HCG was negative.  CMP was unremarkable, lipase normal. CT PE was negative.   Patient received 1L IV fluids, Toradol 15mg IV for pain, and Rocephin 1g for UTI   On admission to the observation unit the patient was stable.      PCP: Ronny    History:    No past medical history on file.    No past surgical history on file.    Family history:  Mother- cholecystectomy last year    Social History     Social History     Marital status: Single     Spouse name: N/A     Number of children: N/A     Years of education: N/A     Occupational History     Not on file.     Social History Main Topics     Smoking status: Not on file     Smokeless tobacco: Not on file     Alcohol use Not on file     Drug use: Not on file     Sexual activity: Not on file     Other Topics Concern     Not on file     Social History Narrative         No current facility-administered medications on file prior to encounter.   No current outpatient prescriptions on file prior to encounter.    Data:    Results for orders placed or performed during the hospital encounter of 11/15/17   Chest CT, IV contrast only - PE protocol    Narrative    EXAMINATION: CT CHEST PULMONARY EMBOLISM W CONTRAST, 11/15/2017 8:18  PM     COMPARISON: No similar prior studies    HISTORY: Positive d-dimer. Tachycardic.    TECHNIQUE: Volumetric helical acquisition of CT images of the chest  from the lung apices to the kidneys were acquired after the  administration of 75 mL of Isovue-370 IV contrast. Three-dimensional  (3D)  post-processed angiographic images were reconstructed, archived  to PACS and used in interpretation of this study.    FINDINGS:   Examination is somewhat suboptimal due to mixing artifact for  evaluating distal pulmonary arterial structures.    No definitely seen pulmonary embolism.    Heart size normal. No pericardial effusion. Pulmonary artery and  aortic arch are normal in size. No lymphadenopathy in the chest.    Lungs are clear. No pneumothorax or pleural effusion.    Visualization of the upper abdomen is limited. The bones are normal.      Impression    IMPRESSION:   1. Somewhat limited evaluation due to mixing artifact. No pulmonary  embolism is suspected.  2. Clear lungs.    I have personally reviewed the examination and initial interpretation  and I agree with the findings.    GOLDIE DUDLEY MD   CBC with platelets differential   Result Value Ref Range    WBC 11.4 (H) 4.0 - 11.0 10e9/L    RBC Count 4.44 3.8 - 5.2 10e12/L    Hemoglobin 13.7 11.7 - 15.7 g/dL    Hematocrit 41.8 35.0 - 47.0 %    MCV 94 78 - 100 fl    MCH 30.9 26.5 - 33.0 pg    MCHC 32.8 31.5 - 36.5 g/dL    RDW 13.4 10.0 - 15.0 %    Platelet Count 271 150 - 450 10e9/L    Diff Method Automated Method     % Neutrophils 73.6 %    % Lymphocytes 13.4 %    % Monocytes 12.5 %    % Eosinophils 0.1 %    % Basophils 0.2 %    % Immature Granulocytes 0.2 %    Nucleated RBCs 0 0 /100    Absolute Neutrophil 8.4 (H) 1.6 - 8.3 10e9/L    Absolute Lymphocytes 1.5 0.8 - 5.3 10e9/L    Absolute Monocytes 1.4 (H) 0.0 - 1.3 10e9/L    Absolute Eosinophils 0.0 0.0 - 0.7 10e9/L    Absolute Basophils 0.0 0.0 - 0.2 10e9/L    Abs Immature Granulocytes 0.0 0 - 0.4 10e9/L    Absolute Nucleated RBC 0.0    Basic metabolic panel   Result Value Ref Range    Sodium 138 133 - 144 mmol/L    Potassium 3.5 3.4 - 5.3 mmol/L    Chloride 106 94 - 109 mmol/L    Carbon Dioxide 24 20 - 32 mmol/L    Anion Gap 7 3 - 14 mmol/L    Glucose 88 70 - 99 mg/dL    Urea Nitrogen 8 7 - 30 mg/dL     Creatinine 0.86 0.52 - 1.04 mg/dL    GFR Estimate 83 >60 mL/min/1.7m2    GFR Estimate If Black >90 >60 mL/min/1.7m2    Calcium 9.0 8.5 - 10.1 mg/dL   HCG qualitative urine   Result Value Ref Range    HCG Qual Urine Negative NEG^Negative   UA reflex to Microscopic and Culture   Result Value Ref Range    Color Urine Yellow     Appearance Urine Clear     Glucose Urine Negative NEG^Negative mg/dL    Bilirubin Urine Negative NEG^Negative    Ketones Urine 80 (A) NEG^Negative mg/dL    Specific Gravity Urine 1.015 1.003 - 1.035    Blood Urine Small (A) NEG^Negative    pH Urine 6.0 5.0 - 7.0 pH    Protein Albumin Urine 10 (A) NEG^Negative mg/dL    Urobilinogen mg/dL 2.0 0.0 - 2.0 mg/dL    Nitrite Urine Negative NEG^Negative    Leukocyte Esterase Urine Trace (A) NEG^Negative    Source Midstream Urine     RBC Urine 8 (H) 0 - 2 /HPF    WBC Urine 2 0 - 2 /HPF    Bacteria Urine Few (A) NEG^Negative /HPF    Squamous Epithelial /HPF Urine 2 (H) 0 - 1 /HPF    Mucous Urine Present (A) NEG^Negative /LPF   HCG qualitative   Result Value Ref Range    HCG Qualitative Serum Negative NEG^Negative   Creatinine POCT   Result Value Ref Range    Creatinine 0.9 0.52 - 1.04 mg/dL    GFR Estimate 79 >60 mL/min/1.7m2    GFR Estimate If Black >90 >60 mL/min/1.7m2   Hepatic panel   Result Value Ref Range    Bilirubin Direct 0.1 0.0 - 0.2 mg/dL    Bilirubin Total 0.7 0.2 - 1.3 mg/dL    Albumin 3.3 (L) 3.4 - 5.0 g/dL    Protein Total 7.6 6.8 - 8.8 g/dL    Alkaline Phosphatase 43 40 - 150 U/L    ALT 38 0 - 50 U/L    AST 37 0 - 45 U/L   Lipase   Result Value Ref Range    Lipase 118 73 - 393 U/L   EKG 12-lead, tracing only   Result Value Ref Range    Interpretation ECG Click View Image link to view waveform and result              EKG Interpretation:      Interpreted by Rianna Werner  Symptoms at time of EKG: tachycardia  Rhythm: Normal sinus  and Sinus bradycardia  Rate: 133  Axis: Normal  Ectopy: None  Conduction: Normal  ST Segments/ T Waves:  No ST-T wave changes and No acute ischemic changes  Comparison to prior: No old EKG available    Clinical Impression: sinus tachycardia    ROS:    Review Of Systems  General: admits to fever, chills. Denies fatigue.  Skin: denies rashes or skin changes.  Eyes: no vision changes.  Ears/Nose/Throat: no sore throat, rhinorrhea, congestion.  Respiratory: No shortness of breath, dyspnea on exertion. Admits to cough. Denies hemoptysis.  Cardiovascular: No chest pain. Admits to racing heartbeat this morning.  Gastrointestinal: Admits to epigastric and RUQ pain. Admits to mild nausea. No vomiting. Denies diarrhea, constipation, bloody or black stools.  Genitourinary: denies dysuria, hematuria, urinary frequency. Admits to bilateral flank pain. Denies pregnancy.  Musculoskeletal: admits to low back pain.  Neurologic: admits to mild headache. Denies presyncope, syncope, paresthesias.  Psychiatric: denies mood changes.  Hematologic/Lymphatic/Immunologic: denies bleeding or bruising.      10 point ROS negative other than the symptoms noted above.      Exam:    Vitals:  B/P: 139/82, T: 99.9, P: 142, R: 21    General: alert, appears comfortable, NAD. Low grade fever.  Skin: no suspicious lesions or rashes   Head: Normocephalic.  EENT: Sclera anicteric.  Oropharynx: MMM.   Neck: Neck supple.  Respiratory: No distress. Equal inspiration to bilateral bases. Lungs CTAB- no crackles, wheezes, or rales.  Cardiovascular: Tachycardic. Normal S1/S2. No murmurs, clicks gallops or rub. No peripheral edema. DP pulses 2+ bilaterally.   Gastrointestinal: Abdomen soft, nondistended, nontedner. BS normal. No masses, organomegaly.   Musculoskeletal: extremities normal- no gross deformities noted, gait normal and normal muscle tone   Neurologic: Follows commands.  Psychiatric: mentation appears normal and affect normal/bright               Signed:  Rianna Werner PA-C  November 15, 2017 at 11:20 PM

## 2017-11-17 NOTE — PLAN OF CARE
Problem: Patient Care Overview  Goal: Plan of Care/Patient Progress Review  Discharge instruction reviewed.  Patient verbalized understanding. PIV removed, patient ambulated to the main lobby with mother. Patient discharged

## 2017-11-18 LAB
BACTERIA SPEC CULT: NORMAL
Lab: NORMAL
SPECIMEN SOURCE: NORMAL

## 2017-12-01 ENCOUNTER — HOSPITAL ENCOUNTER (OUTPATIENT)
Dept: NUCLEAR MEDICINE | Facility: CLINIC | Age: 22
Setting detail: NUCLEAR MEDICINE
Discharge: HOME | End: 2017-12-01
Attending: FAMILY MEDICINE | Admitting: FAMILY MEDICINE
Payer: COMMERCIAL

## 2017-12-01 DIAGNOSIS — R10.11 RIGHT UPPER QUADRANT PAIN: ICD-10-CM

## 2017-12-01 PROCEDURE — 34300033 ZZH RX 343: Performed by: RADIOLOGY

## 2017-12-01 PROCEDURE — A9537 TC99M MEBROFENIN: HCPCS | Performed by: RADIOLOGY

## 2017-12-01 PROCEDURE — 78227 HEPATOBIL SYST IMAGE W/DRUG: CPT

## 2017-12-01 RX ORDER — KIT FOR THE PREPARATION OF TECHNETIUM TC 99M MEBROFENIN 45 MG/10ML
4.8-7.2 INJECTION, POWDER, LYOPHILIZED, FOR SOLUTION INTRAVENOUS ONCE
Status: COMPLETED | OUTPATIENT
Start: 2017-12-01 | End: 2017-12-01

## 2017-12-01 RX ADMIN — MEBROFENIN 5 MCI.: 45 INJECTION, POWDER, LYOPHILIZED, FOR SOLUTION INTRAVENOUS at 08:37

## 2022-09-17 ENCOUNTER — HEALTH MAINTENANCE LETTER (OUTPATIENT)
Age: 27
End: 2022-09-17

## 2022-10-31 ENCOUNTER — OFFICE VISIT (OUTPATIENT)
Dept: FAMILY MEDICINE | Facility: CLINIC | Age: 27
End: 2022-10-31
Payer: COMMERCIAL

## 2022-10-31 VITALS
HEIGHT: 67 IN | WEIGHT: 293 LBS | BODY MASS INDEX: 45.99 KG/M2 | DIASTOLIC BLOOD PRESSURE: 88 MMHG | SYSTOLIC BLOOD PRESSURE: 136 MMHG | RESPIRATION RATE: 16 BRPM | HEART RATE: 96 BPM | OXYGEN SATURATION: 96 % | TEMPERATURE: 98.6 F

## 2022-10-31 DIAGNOSIS — Z23 HIGH PRIORITY FOR 2019-NCOV VACCINE: ICD-10-CM

## 2022-10-31 DIAGNOSIS — Z11.59 NEED FOR HEPATITIS C SCREENING TEST: ICD-10-CM

## 2022-10-31 DIAGNOSIS — L70.0 CYSTIC ACNE: ICD-10-CM

## 2022-10-31 DIAGNOSIS — Z12.4 SCREENING FOR MALIGNANT NEOPLASM OF CERVIX: ICD-10-CM

## 2022-10-31 DIAGNOSIS — Z00.00 ROUTINE GENERAL MEDICAL EXAMINATION AT A HEALTH CARE FACILITY: Primary | ICD-10-CM

## 2022-10-31 DIAGNOSIS — Z11.4 SCREENING FOR HIV (HUMAN IMMUNODEFICIENCY VIRUS): ICD-10-CM

## 2022-10-31 DIAGNOSIS — Z23 NEED FOR PROPHYLACTIC VACCINATION AND INOCULATION AGAINST INFLUENZA: ICD-10-CM

## 2022-10-31 DIAGNOSIS — Z11.3 SCREENING FOR STD (SEXUALLY TRANSMITTED DISEASE): ICD-10-CM

## 2022-10-31 DIAGNOSIS — Z12.4 SCREENING FOR CERVICAL CANCER: ICD-10-CM

## 2022-10-31 PROBLEM — Z97.5 NEXPLANON IN PLACE: Status: ACTIVE | Noted: 2021-03-17

## 2022-10-31 PROBLEM — F41.1 GAD (GENERALIZED ANXIETY DISORDER): Status: ACTIVE | Noted: 2018-12-24

## 2022-10-31 LAB
CLUE CELLS: PRESENT
HBA1C MFR BLD: 5.3 % (ref 0–5.6)
TRICHOMONAS, WET PREP: ABNORMAL
WBC'S/HIGH POWER FIELD, WET PREP: ABNORMAL
YEAST, WET PREP: ABNORMAL

## 2022-10-31 PROCEDURE — G0124 SCREEN C/V THIN LAYER BY MD: HCPCS | Performed by: PATHOLOGY

## 2022-10-31 PROCEDURE — 90686 IIV4 VACC NO PRSV 0.5 ML IM: CPT | Performed by: FAMILY MEDICINE

## 2022-10-31 PROCEDURE — 87624 HPV HI-RISK TYP POOLED RSLT: CPT | Performed by: FAMILY MEDICINE

## 2022-10-31 PROCEDURE — 87389 HIV-1 AG W/HIV-1&-2 AB AG IA: CPT | Performed by: FAMILY MEDICINE

## 2022-10-31 PROCEDURE — 87491 CHLMYD TRACH DNA AMP PROBE: CPT | Performed by: FAMILY MEDICINE

## 2022-10-31 PROCEDURE — 0134A COVID-19,PF,MODERNA BIVALENT: CPT | Performed by: FAMILY MEDICINE

## 2022-10-31 PROCEDURE — G0145 SCR C/V CYTO,THINLAYER,RESCR: HCPCS | Performed by: FAMILY MEDICINE

## 2022-10-31 PROCEDURE — 87340 HEPATITIS B SURFACE AG IA: CPT | Performed by: FAMILY MEDICINE

## 2022-10-31 PROCEDURE — 84270 ASSAY OF SEX HORMONE GLOBUL: CPT | Performed by: FAMILY MEDICINE

## 2022-10-31 PROCEDURE — 91313 COVID-19,PF,MODERNA BIVALENT: CPT | Performed by: FAMILY MEDICINE

## 2022-10-31 PROCEDURE — 86803 HEPATITIS C AB TEST: CPT | Performed by: FAMILY MEDICINE

## 2022-10-31 PROCEDURE — 99213 OFFICE O/P EST LOW 20 MIN: CPT | Mod: 25 | Performed by: FAMILY MEDICINE

## 2022-10-31 PROCEDURE — 87591 N.GONORRHOEAE DNA AMP PROB: CPT | Performed by: FAMILY MEDICINE

## 2022-10-31 PROCEDURE — 84403 ASSAY OF TOTAL TESTOSTERONE: CPT | Performed by: FAMILY MEDICINE

## 2022-10-31 PROCEDURE — 99385 PREV VISIT NEW AGE 18-39: CPT | Mod: 25 | Performed by: FAMILY MEDICINE

## 2022-10-31 PROCEDURE — 83036 HEMOGLOBIN GLYCOSYLATED A1C: CPT | Performed by: FAMILY MEDICINE

## 2022-10-31 PROCEDURE — 36415 COLL VENOUS BLD VENIPUNCTURE: CPT | Performed by: FAMILY MEDICINE

## 2022-10-31 PROCEDURE — 90471 IMMUNIZATION ADMIN: CPT | Performed by: FAMILY MEDICINE

## 2022-10-31 PROCEDURE — 87210 SMEAR WET MOUNT SALINE/INK: CPT | Performed by: FAMILY MEDICINE

## 2022-10-31 PROCEDURE — 86780 TREPONEMA PALLIDUM: CPT | Performed by: FAMILY MEDICINE

## 2022-10-31 RX ORDER — TRAZODONE HYDROCHLORIDE 50 MG/1
TABLET, FILM COATED ORAL
COMMUNITY
Start: 2022-09-20

## 2022-10-31 RX ORDER — PEN NEEDLE, DIABETIC 31 GX5/16"
1 NEEDLE, DISPOSABLE MISCELLANEOUS DAILY
Qty: 100 EACH | Refills: 0 | Status: SHIPPED | OUTPATIENT
Start: 2022-10-31 | End: 2023-01-13

## 2022-10-31 RX ORDER — VENLAFAXINE HYDROCHLORIDE 150 MG/1
150 CAPSULE, EXTENDED RELEASE ORAL EVERY MORNING
COMMUNITY
Start: 2022-03-30

## 2022-10-31 RX ORDER — TOPIRAMATE 25 MG/1
TABLET, FILM COATED ORAL
COMMUNITY
Start: 2022-08-08

## 2022-10-31 RX ORDER — SEMAGLUTIDE 0.25 MG/.5ML
0.25 INJECTION, SOLUTION SUBCUTANEOUS WEEKLY
Qty: 2 ML | Refills: 0 | Status: SHIPPED | OUTPATIENT
Start: 2022-10-31 | End: 2022-11-28

## 2022-10-31 RX ORDER — DOXYCYCLINE 100 MG/1
100 CAPSULE ORAL 2 TIMES DAILY
Qty: 30 CAPSULE | Refills: 0 | Status: SHIPPED | OUTPATIENT
Start: 2022-10-31 | End: 2022-11-14

## 2022-10-31 ASSESSMENT — ENCOUNTER SYMPTOMS
DIZZINESS: 0
HEADACHES: 0
HEMATOCHEZIA: 0
SHORTNESS OF BREATH: 0
JOINT SWELLING: 0
DIARRHEA: 0
ABDOMINAL PAIN: 0
BREAST MASS: 0
SORE THROAT: 0
NERVOUS/ANXIOUS: 0
COUGH: 0
CONSTIPATION: 0
HEARTBURN: 0
PARESTHESIAS: 0
FEVER: 0
DYSURIA: 0
MYALGIAS: 0
EYE PAIN: 0
WEAKNESS: 0
NAUSEA: 0
HEMATURIA: 0
PALPITATIONS: 0
ARTHRALGIAS: 0
FREQUENCY: 0
CHILLS: 0

## 2022-10-31 ASSESSMENT — PAIN SCALES - GENERAL: PAINLEVEL: NO PAIN (0)

## 2022-10-31 NOTE — PROGRESS NOTES
SUBJECTIVE:   CC: Rianna is an 27 year old who presents for preventive health visit.     Patient has been advised of split billing requirements and indicates understanding: Yes  Healthy Habits:     Getting at least 3 servings of Calcium per day:  Yes    Bi-annual eye exam:  NO    Dental care twice a year:  Yes    Sleep apnea or symptoms of sleep apnea:  None    Diet:  Regular (no restrictions)    Frequency of exercise:  4-5 days/week    Duration of exercise:  30-45 minutes    Taking medications regularly:  Yes    Medication side effects:  Lightheadedness    PHQ-2 Total Score: 2    Additional concerns today:  Yes (STD screen, rash on back and bottom for a few month, redn, bumpy and itching, weight mgmt, taking metformin and topamax and has been gaining weight,)    Rash on lower and upper back. Present for more than one month.     Today's PHQ-2 Score:   PHQ-2 ( 1999 [employer]) 10/31/2022   Q1: Little interest or pleasure in doing things 1   Q2: Feeling down, depressed or hopeless 1   PHQ-2 Score 2   Q1: Little interest or pleasure in doing things Not at all   Q2: Feeling down, depressed or hopeless Several days   PHQ-2 Score 1       Abuse: Current or Past (Physical, Sexual or Emotional) - No  Do you feel safe in your environment? Yes    Social History     Tobacco Use     Smoking status: Not on file     Smokeless tobacco: Not on file   Substance Use Topics     Alcohol use: Not on file     If you drink alcohol do you typically have >3 drinks per day or >7 drinks per week? No    Alcohol Use 10/31/2022   Prescreen: >3 drinks/day or >7 drinks/week? No   No flowsheet data found.    Reviewed orders with patient.  Reviewed health maintenance and updated orders accordingly - Yes  Lab work is in process    Breast Cancer Screening:    Breast CA Risk Assessment (FHS-7) 10/31/2022   Do you have a family history of breast, colon, or ovarian cancer? No / Unknown       click delete button to remove this line now  Patient under 40  years of age: Routine Mammogram Screening not recommended.   Pertinent mammograms are reviewed under the imaging tab.    History of abnormal Pap smear: YES - updated in Problem List and Health Maintenance accordingly     Reviewed and updated as needed this visit by clinical staff                  Reviewed and updated as needed this visit by Provider                     Review of Systems   Constitutional: Negative for chills and fever.   HENT: Negative for congestion, ear pain, hearing loss and sore throat.    Eyes: Negative for pain and visual disturbance.   Respiratory: Negative for cough and shortness of breath.    Cardiovascular: Negative for chest pain, palpitations and peripheral edema.   Gastrointestinal: Negative for abdominal pain, constipation, diarrhea, heartburn, hematochezia and nausea.   Breasts:  Negative for tenderness, breast mass and discharge.   Genitourinary: Negative for dysuria, frequency, genital sores, hematuria, pelvic pain, urgency, vaginal bleeding and vaginal discharge.   Musculoskeletal: Negative for arthralgias, joint swelling and myalgias.   Skin: Positive for rash.   Neurological: Negative for dizziness, weakness, headaches and paresthesias.   Psychiatric/Behavioral: Negative for mood changes. The patient is not nervous/anxious.         OBJECTIVE:   There were no vitals taken for this visit.  Physical Exam  GENERAL: healthy, alert and no distress  EYES: Eyes grossly normal to inspection, PERRL and conjunctivae and sclerae normal  HENT: ear canals and TM's normal, nose and mouth without ulcers or lesions  NECK: no adenopathy, no asymmetry, masses, or scars and thyroid normal to palpation  RESP: lungs clear to auscultation - no rales, rhonchi or wheezes  BREAST: normal without masses, tenderness or nipple discharge and no palpable axillary masses or adenopathy  CV: regular rate and rhythm, normal S1 S2, no S3 or S4, no murmur, click or rub, no peripheral edema and peripheral pulses  strong  ABDOMEN: soft, nontender, no hepatosplenomegaly, no masses and bowel sounds normal   (female): normal female external genitalia, normal urethral meatus, vaginal mucosa pink, moist, well rugated, and normal cervix/adnexa/uterus without masses or discharge  MS: no gross musculoskeletal defects noted, no edema  SKIN: Diffused inflamed acne on upper and lower back.  NEURO: Normal strength and tone, mentation intact and speech normal  PSYCH: mentation appears normal, affect normal/bright    Diagnostic Test Results:  Labs reviewed in Epic    ASSESSMENT/PLAN:   (Z00.00) Routine general medical examination at a health care facility  (primary encounter diagnosis)  Comment:  Plan: REVIEW OF HEALTH MAINTENANCE PROTOCOL ORDERS,         Hemoglobin A1c            (L70.0) Cystic acne  Comment: Diffuse scattered inflamed acne and upper and lower back.  Patient also has acne on anterior chest and chin.  Plan: doxycycline hyclate (VIBRAMYCIN) 100 MG capsule            (Z68.42) Body mass index (BMI) of 45.0-49.9 in adult (H)  Comment: We had an extended discussion regarding medications for weight loss.  Patient was also informed about surgical management of obesity such as gastric bypass or gastric sleeve.  She desires to proceed with a medication.  She tried phentermine and Topamax in the past and had significant weight loss.  Oral medication and GLP 1 injectable agonists were discussed.  Patient desires to proceed with the medication.  We attempted to send Saxenda but it seems that Wegovy is covered by her insurance.   Plan: insulin pen needle (31G X 5 MM) 31G X 5 MM         miscellaneous, Alcohol Swabs (ALCOHOL PREP)         PADS, Semaglutide-Weight Management (WEGOVY)         0.25 MG/0.5ML SOAJ, metFORMIN (GLUCOPHAGE) 500         MG tablet, Testosterone Bioavailable,         Hemoglobin A1c            (Z12.4) Screening for cervical cancer  Plan: pap smear ordered.     (Z11.4) Screening for HIV (human immunodeficiency  "virus)  Plan: HIV Antigen Antibody Combo            (Z11.59) Need for hepatitis C screening test  Plan: Hepatitis C Screen Reflex to HCV RNA Quant and         Genotype          (Z23) High priority for 2019-nCoV vaccine  Comment: Routine immunization ordered.  Plan: COVID-19,PF,MODERNA BIVALENT 18+Yrs, CANCELED:         COVID-19,PF,MODERNA BIVALENT (18+YRS)            (Z11.3) Screening for STD (sexually transmitted disease)  Comment: Denies any recent exposure.  Denies any symptoms.  Plan: Hepatitis B surface antigen, Treponema Abs w         Reflex to RPR and Titer, Wet preparation,         Chlamydia & Gonorrhea by PCR, GICH/Range -         Clinic Collect            (Z12.4) Screening for malignant neoplasm of cervix  Comment: Patient has a history of ASCUS with positive HPV.  Has a history of colposcopy.  Plan: PAP screen reflex to HPV if ASCUS - recommended        age 25 - 29 years            (Z23) Need for prophylactic vaccination and inoculation against influenza  Comment: Routine immunization ordered.  Plan: INFLUENZA VACCINE IM > 6 MONTHS VALENT IIV4         (AFLURIA/FLUZONE)              Patient has been advised of split billing requirements and indicates understanding: Yes      COUNSELING:  Reviewed preventive health counseling, as reflected in patient instructions       Regular exercise       Healthy diet/nutrition    Estimated body mass index is 44.58 kg/m  as calculated from the following:    Height as of 11/16/17: 1.676 m (5' 6\").    Weight as of 11/16/17: 125.3 kg (276 lb 3.2 oz).    She has no history on file for tobacco use.      Counseling Resources:  ATP IV Guidelines  Pooled Cohorts Equation Calculator  Breast Cancer Risk Calculator  BRCA-Related Cancer Risk Assessment: FHS-7 Tool  FRAX Risk Assessment  ICSI Preventive Guidelines  Dietary Guidelines for Americans, 2010  USDA's MyPlate  ASA Prophylaxis  Lung CA Screening    Richard Brambila MD  [facility]  "

## 2022-10-31 NOTE — RESULT ENCOUNTER NOTE
Dear Rianna,   - Your vaginal swab shows clue cells which are consistent with bacterial vaginosis. Which is basically a shift in the normal bacterial tiffany in your vaginal area. Sometimes this resolves by itself and does not need treatment. For a small number of people, they notice a discharge or odor.  If you are noticing symptoms such as discharge or odor, let us know and we will send a prescription to your pharmacy which can help with the return to normal bacterial tiffany.     Probiotics and eating yogurt will help prevent it in the future.      Best Regards  Richard Brambila MD

## 2022-10-31 NOTE — PATIENT INSTRUCTIONS
MEDICATION STARTED AT THIS APPOINTMENT    We are starting a GLP-1 (Glucagon-like Peptide-1) medication. Examples of this medication include Byetta, Bydureon, , Victoza, Saxenda, Wegovy and Ozempic . The medication chosen will depend on insurance coverage, and can be changed to the medication that is covered under your plan. These medications work the same, in that they can help you lose weight and control your blood sugar. One of the ways it works is by slowing down the rate that food leaves your stomach. You feel rodriguez and will eat less.  It also helps regulate hormones that can help improve your blood sugars.    Usually short acting insulins or oral agents are stopped or decreased by the doctor at the appointment. Low blood sugars are rare but can happen if patients are on insulin or other oral agents. If you notice consistent low sugars or high sugars, your medication may need to be adjusted after your appointment. If this is the case, please call RN and provide her your blood sugar record from the last 3-4 days. The RN will get in touch with the doctor and call you back/[employer] message with recommendations. We tolerate high sugars for a bit, so if sugars are running 180-200, this is ok. As weight starts dropping the blood sugars should too. If readings are consistently over 200 for 1-2 weeks, then you should call the doctor/nurse.    Types of GLP-1 medications include:    Saxenda: Starting dose is 0.6 mg for a week, then 1.2 mg for a week, then 1.8 mg for 1 week, then 2.4 mg for 1 week and then 3 mg daily thereafter (if prescribed by doctor). This medication is given daily. Pen needles need to be ordered also.    Victoza: Starting dose is 0.6 mg for a week, then 1.2 mg for a week, and then 1.8 mg thereafter (if prescribed by doctor). This medication is given daily. Pen needles need to be ordered also.    Wegovy: Starting dose is 0.25 mg once weekly for 4 weeks, then increase to 0.5 mg weekly x 4 weeks. If  after 4 weeks of being on 0.5 mg weekly dosing. You should schedule a follow-up visit for medication titration.    Side effects of GLP- Medications include: The most common side effects are all GI related and consist of: nausea, constipation, diarrhea, burping, or gassiness. Patients are advised to eat slowly and less, and nausea typically passes if people can stick it out.     The risk of pancreatitis (inflammation of the pancreas) has been associated with this type of medication, but is very rare.  If you have had pancreatitis in the past, this medication may not be for you. Please let us know about any past history of pancreas problems.      Symptoms of pancreatitis include: Pain in your upper stomach area which  may travel to your back and be worse after eating. Your stomach area may be tender to the touch.  You may have vomiting or nausea and/or have a fever. If you should develop any of these symptoms, stop the medication and contact your primary care doctor. They will do a blood test to check for pancreatitis.         There is a small chance you may have some low blood sugar after taking the medication.   The signs of low blood sugar are:  Weakness  Shaky   Hungry  Sweating  Confusion      See below for ways to treat low blood sugar without adding in lots of extra calories.      Treating Low Blood Sugar    If you have symptoms of low blood sugar (sweating, shaking, dizzy, confused) eat 15 grams of carbs and wait 15 minutes:    Glucose Tabs are best for sugars under 70 -  Dex4 or BD Glucose tablets are good, you will need to take 3-4 of these to equal 15 grams.     One small box of raisins  4 oz fruit juice box or   cup fruit juice  1 small apple  1 small banana    cup canned fruit in water    English muffin or a slice of bread with jelly   1 low fat frozen waffle with sugar-free syrup    cup cottage cheese with   cup frozen or fresh blueberries  1 cup skim or low-fat milk    cup whole grain cereal  4-6  crackers such as Triscuits      This medication is usually covered by insurance for patients with a diagnosis of Type 2 Diabetes. Depending on insurance coverage, the medication may be changed to a different formulary, but they all work in the same way. Sometimes a prior authorization is required, which may take up to 1-2 weeks for an insurance company to make a decision if they will cover the medication. Please be patient, you will be notified either way.   ___________________________________________________________________      Preventive Health Recommendations  Female Ages 26 - 39  Yearly exam:   See your health care provider every year in order to  Review health changes.   Discuss preventive care.    Review your medicines if you your doctor has prescribed any.    Until age 30: Get a Pap test every three years (more often if you have had an abnormal result).    After age 30: Talk to your doctor about whether you should have a Pap test every 3 years or have a Pap test with HPV screening every 5 years.   You do not need a Pap test if your uterus was removed (hysterectomy) and you have not had cancer.  You should be tested each year for STDs (sexually transmitted diseases), if you're at risk.   Talk to your provider about how often to have your cholesterol checked.  If you are at risk for diabetes, you should have a diabetes test (fasting glucose).  Shots: Get a flu shot each year. Get a tetanus shot every 10 years.   Nutrition:   Eat at least 5 servings of fruits and vegetables each day.  Eat whole-grain bread, whole-wheat pasta and brown rice instead of white grains and rice.  Get adequate Calcium and Vitamin D.     Lifestyle  Exercise at least 150 minutes a week (30 minutes a day, 5 days of the week). This will help you control your weight and prevent disease.  Limit alcohol to one drink per day.  No smoking.   Wear sunscreen to prevent skin cancer.  See your dentist every six months for an exam and cleaning.

## 2022-11-01 LAB
C TRACH DNA SPEC QL PROBE+SIG AMP: NEGATIVE
HBV SURFACE AG SERPL QL IA: NONREACTIVE
HCV AB SERPL QL IA: NONREACTIVE
HIV 1+2 AB+HIV1 P24 AG SERPL QL IA: NONREACTIVE
N GONORRHOEA DNA SPEC QL NAA+PROBE: NEGATIVE
SHBG SERPL-SCNC: 31 NMOL/L (ref 30–135)
T PALLIDUM AB SER QL: NONREACTIVE

## 2022-11-02 ENCOUNTER — MYC MEDICAL ADVICE (OUTPATIENT)
Dept: FAMILY MEDICINE | Facility: CLINIC | Age: 27
End: 2022-11-02

## 2022-11-02 ENCOUNTER — TELEPHONE (OUTPATIENT)
Dept: FAMILY MEDICINE | Facility: CLINIC | Age: 27
End: 2022-11-02

## 2022-11-02 DIAGNOSIS — E66.01 MORBID OBESITY (H): Primary | ICD-10-CM

## 2022-11-02 RX ORDER — PHENTERMINE HYDROCHLORIDE 15 MG/1
15 CAPSULE ORAL EVERY MORNING
Qty: 30 CAPSULE | Refills: 0 | Status: SHIPPED | OUTPATIENT
Start: 2022-11-02 | End: 2023-01-04

## 2022-11-02 NOTE — TELEPHONE ENCOUNTER
Prior Authorization Retail Medication Request    Medication/Dose: phentermine (ADIPEX-P) 15 MG capsule  ICD code (if different than what is on RX):  Morbid obesity (H) [E66.01]  - Primary   Previously Tried and Failed:  unknown  Rationale:  unknown    Insurance Name:  [employer]  Insurance ID:  [id number]      Pharmacy Information (if different than what is on RX)  Name:  [facility]  Phone:  [phone]

## 2022-11-02 NOTE — TELEPHONE ENCOUNTER
Prior Authorization Approval    Authorization Effective Date: 10/3/2022  Authorization Expiration Date: 1/31/2023  Medication: phentermine (ADIPEX-P) 15 MG capsule  Approved Dose/Quantity:    Reference #:     Insurance Company: [employer] - Phone [phone] Fax [phone]  Expected CoPay:       CoPay Card Available:      Foundation Assistance Needed:    Which Pharmacy is filling the prescription (Not needed for infusion/clinic administered): [facility] [address] [address] [employer] [place] [address] AT [facility]  Pharmacy Notified: Yes  Patient Notified: Yes  **Instructed pharmacy to notify patient when script is ready to /ship.**

## 2022-11-03 LAB
BKR LAB AP GYN ADEQUACY: ABNORMAL
BKR LAB AP GYN INTERPRETATION: ABNORMAL
BKR LAB AP HPV REFLEX: ABNORMAL
BKR LAB AP PREVIOUS ABNORMAL: ABNORMAL
PATH REPORT.COMMENTS IMP SPEC: ABNORMAL
PATH REPORT.COMMENTS IMP SPEC: ABNORMAL
PATH REPORT.RELEVANT HX SPEC: ABNORMAL

## 2022-11-06 LAB
TESTOST FREE SERPL-MCNC: 0.75 NG/DL
TESTOST SERPL-MCNC: 40 NG/DL (ref 8–60)

## 2022-11-07 ENCOUNTER — PATIENT OUTREACH (OUTPATIENT)
Dept: FAMILY MEDICINE | Facility: CLINIC | Age: 27
End: 2022-11-07

## 2022-11-07 LAB
HUMAN PAPILLOMA VIRUS 16 DNA: NEGATIVE
HUMAN PAPILLOMA VIRUS 18 DNA: NEGATIVE
HUMAN PAPILLOMA VIRUS FINAL DIAGNOSIS: ABNORMAL
HUMAN PAPILLOMA VIRUS OTHER HR: POSITIVE

## 2022-11-15 DIAGNOSIS — N76.0 BV (BACTERIAL VAGINOSIS): Primary | ICD-10-CM

## 2022-11-15 DIAGNOSIS — B96.89 BV (BACTERIAL VAGINOSIS): Primary | ICD-10-CM

## 2022-11-15 RX ORDER — METRONIDAZOLE 500 MG/1
500 TABLET ORAL 2 TIMES DAILY
Qty: 14 TABLET | Refills: 0 | Status: SHIPPED | OUTPATIENT
Start: 2022-11-15 | End: 2022-11-22

## 2023-01-04 ENCOUNTER — VIRTUAL VISIT (OUTPATIENT)
Dept: FAMILY MEDICINE | Facility: CLINIC | Age: 28
End: 2023-01-04
Attending: FAMILY MEDICINE
Payer: COMMERCIAL

## 2023-01-04 DIAGNOSIS — E66.01 MORBID OBESITY (H): ICD-10-CM

## 2023-01-04 PROCEDURE — 99213 OFFICE O/P EST LOW 20 MIN: CPT | Mod: 95 | Performed by: FAMILY MEDICINE

## 2023-01-04 RX ORDER — PHENTERMINE HYDROCHLORIDE 30 MG/1
30 CAPSULE ORAL EVERY MORNING
Qty: 30 CAPSULE | Refills: 0 | Status: SHIPPED | OUTPATIENT
Start: 2023-01-04

## 2023-01-04 NOTE — PROGRESS NOTES
Rianna is a 27 year old who is being evaluated via a billable telephone visit.      What phone number would you like to be contacted at? [phone]  How would you like to obtain your AVS? [place]  Distant Location (provider location):  On-site    Assessment & Plan     Morbid obesity (H)  Assessment: We had an extended discussion regarding weight loss medications in the past.  Patient's insurance rejected a prescription for Ozempic.  She was given a prescription of phentermine 15 mg once daily.  We mutually agreed to increase the dose slightly to 30 mg once a day.  Patient should continue with Topamax as well.  Patient was informed that she is due for a weight check her next visit should be an office visit or virtual visit with weight checked at home prior to her visit.  Plan:  - phentermine (ADIPEX-P) 30 MG capsule; Take 1 capsule (30 mg) by mouth every morning    Return in about 1 month (around 2/4/2023) for Follow-up visit for Weight Management.    Richard Brambila MD  [facility]   Rianna is a 27 year old, presenting for the following health issues:  Recheck Medication      History of Present Illness       Reason for visit:  Follow up from annual exam and to check in on weight management meds.    She eats 2-3 servings of fruits and vegetables daily.She consumes 0 sweetened beverage(s) daily.She exercises with enough effort to increase her heart rate 20 to 29 minutes per day.  She exercises with enough effort to increase her heart rate 3 or less days per week. She is missing 1 dose(s) of medications per week.  She is not taking prescribed medications regularly due to remembering to take.       Medication Followup of topamax 25mg and Phenteramine    Taking Medication as prescribed: yes    Side Effects:  None    Medication Helping Symptoms:  NO    Wt Readings from Last 4 Encounters:   10/31/22 136.5 kg (301 lb)   11/16/17 125.3 kg (276 lb 3.2 oz)       Review of Systems    Constitutional, HEENT, cardiovascular, pulmonary, gi and gu systems are negative, except as otherwise noted.      Objective             Physical Exam   healthy, alert and no distress  PSYCH: Alert and oriented times 3; coherent speech, normal   rate and volume, able to articulate logical thoughts, able   to abstract reason, no tangential thoughts, no hallucinations   or delusions  Her affect is normal  RESP: No cough, no audible wheezing, able to talk in full sentences  Remainder of exam unable to be completed due to telephone visits    No results found for any visits on 01/04/23.          Phone call duration: 8 minutes

## 2023-08-30 ENCOUNTER — LAB (OUTPATIENT)
Dept: LAB | Facility: CLINIC | Age: 28
End: 2023-08-30
Payer: COMMERCIAL

## 2023-08-30 DIAGNOSIS — F41.1 GENERALIZED ANXIETY DISORDER: Primary | ICD-10-CM

## 2023-08-30 LAB
CHOLEST SERPL-MCNC: 132 MG/DL
FASTING STATUS PATIENT QL REPORTED: ABNORMAL
GLUCOSE SERPL-MCNC: 100 MG/DL (ref 70–99)
HBA1C MFR BLD: 5.3 % (ref 0–5.6)
HDLC SERPL-MCNC: 72 MG/DL
LDLC SERPL CALC-MCNC: 53 MG/DL
NONHDLC SERPL-MCNC: 60 MG/DL
TRIGL SERPL-MCNC: 35 MG/DL

## 2023-08-30 PROCEDURE — 36415 COLL VENOUS BLD VENIPUNCTURE: CPT

## 2023-08-30 PROCEDURE — 83036 HEMOGLOBIN GLYCOSYLATED A1C: CPT

## 2023-08-30 PROCEDURE — 82947 ASSAY GLUCOSE BLOOD QUANT: CPT

## 2023-08-30 PROCEDURE — 80061 LIPID PANEL: CPT

## 2023-12-16 ENCOUNTER — HEALTH MAINTENANCE LETTER (OUTPATIENT)
Age: 28
End: 2023-12-16

## 2023-12-28 ENCOUNTER — PATIENT OUTREACH (OUTPATIENT)
Dept: FAMILY MEDICINE | Facility: CLINIC | Age: 28
End: 2023-12-28
Payer: COMMERCIAL

## 2023-12-28 DIAGNOSIS — R87.610 ATYPICAL SQUAMOUS CELLS OF UNDETERMINED SIGNIFICANCE ON CYTOLOGIC SMEAR OF CERVIX (ASC-US): Primary | ICD-10-CM

## 2024-01-23 PROBLEM — R87.610 ATYPICAL SQUAMOUS CELLS OF UNDETERMINED SIGNIFICANCE ON CYTOLOGIC SMEAR OF CERVIX (ASC-US): Status: ACTIVE | Noted: 2020-11-05

## 2025-03-09 ENCOUNTER — HEALTH MAINTENANCE LETTER (OUTPATIENT)
Age: 30
End: 2025-03-09